# Patient Record
Sex: FEMALE | Race: WHITE | URBAN - NONMETROPOLITAN AREA
[De-identification: names, ages, dates, MRNs, and addresses within clinical notes are randomized per-mention and may not be internally consistent; named-entity substitution may affect disease eponyms.]

---

## 2020-06-22 ENCOUNTER — OFFICE VISIT (OUTPATIENT)
Dept: URBAN - NONMETROPOLITAN AREA CLINIC 2 | Facility: CLINIC | Age: 35
End: 2020-06-22
Payer: COMMERCIAL

## 2020-06-22 DIAGNOSIS — Z12.11 COLON CANCER SCREENING: ICD-10-CM

## 2020-06-22 DIAGNOSIS — K58.9 IBS (IRRITABLE BOWEL SYNDROME): ICD-10-CM

## 2020-06-22 DIAGNOSIS — K21.9 ESOPHAGEAL REFLUX: ICD-10-CM

## 2020-06-22 PROCEDURE — G8427 DOCREV CUR MEDS BY ELIG CLIN: HCPCS | Performed by: NURSE PRACTITIONER

## 2020-06-22 PROCEDURE — 1036F TOBACCO NON-USER: CPT | Performed by: NURSE PRACTITIONER

## 2020-06-22 PROCEDURE — 99213 OFFICE O/P EST LOW 20 MIN: CPT | Performed by: NURSE PRACTITIONER

## 2020-06-22 PROCEDURE — G8417 CALC BMI ABV UP PARAM F/U: HCPCS | Performed by: NURSE PRACTITIONER

## 2020-06-22 RX ORDER — PANTOPRAZOLE SODIUM 40 MG/1
TAKE 1 TABLET BY ORAL ROUTE 2 TIMES A DAY TABLET, DELAYED RELEASE ORAL 2
Qty: 180 | Refills: 3 | Status: ACTIVE | COMMUNITY
Start: 2019-09-26 | End: 2020-09-20

## 2020-06-22 RX ORDER — HYOSCYAMINE SULFATE 0.12 MG/1
1 TABLET UNDER THE TONGUE AND ALLOW TO DISSOLVE  AS NEEDED TABLET SUBLINGUAL
Qty: 180 TABLET | Refills: 6 | OUTPATIENT
Start: 2020-06-22 | End: 2022-03-14

## 2020-06-22 RX ORDER — TRIMETHOBENZAMIDE HYDROCHLORIDE 300 MG/1
CAPSULE ORAL
Qty: 0 | Refills: 0 | Status: ACTIVE | COMMUNITY
Start: 1900-01-01

## 2020-06-22 RX ORDER — FLUTICASONE PROPIONATE 50 UG/1
SPRAY, METERED NASAL
Qty: 0 | Refills: 0 | Status: ACTIVE | COMMUNITY
Start: 1900-01-01

## 2020-06-22 RX ORDER — FAMOTIDINE 40 MG/1
TAKE 1 TABLET (40 MG) BY ORAL ROUTE ONCE DAILY AT BEDTIME TABLET ORAL 1
Qty: 90 | Refills: 3 | Status: ACTIVE | COMMUNITY
Start: 2019-09-26 | End: 2020-09-20

## 2020-06-22 RX ORDER — GUAIFENESIN 1200 MG/1
TAKE 1 TABLET BY MOUTH TWICE DAILY TABLET, EXTENDED RELEASE ORAL
Qty: 0 | Refills: 0 | Status: ACTIVE | COMMUNITY
Start: 1900-01-01

## 2020-06-22 RX ORDER — FLUTICASONE PROPIONATE 100 UG/1
INHALE 1 PUFF (100 MCG) BY INHALATION ROUTE 2 TIMES PER DAY POWDER, METERED RESPIRATORY (INHALATION) 2
Qty: 1 | Refills: 0 | Status: ACTIVE | COMMUNITY
Start: 1900-01-01

## 2020-06-22 RX ORDER — ESOMEPRAZOLE MAGNESIUM 40 MG
TAKE 1 CAPSULE (40 MG) BY ORAL ROUTE ONCE DAILY CAPSULE,DELAYED RELEASE (ENTERIC COATED) ORAL 1
Qty: 0 | Refills: 0 | Status: ACTIVE | COMMUNITY
Start: 1900-01-01

## 2020-06-22 RX ORDER — MONTELUKAST SODIUM 10 MG/1
TAKE 1 TABLET (10 MG) BY ORAL ROUTE ONCE DAILY IN THE EVENING TABLET, FILM COATED ORAL 1
Qty: 0 | Refills: 0 | Status: ACTIVE | COMMUNITY
Start: 1900-01-01

## 2020-06-22 RX ORDER — TOPIRAMATE 25 MG/1
TAKE 1 TABLET BY MOUTH EVERY MORNING AND 2 TABLETS EVERY EVENING TABLET, COATED ORAL 2
Qty: 0 | Refills: 0 | Status: ACTIVE | COMMUNITY
Start: 1900-01-01

## 2020-06-22 NOTE — HPI-TODAY'S VISIT:
6/22/2020 Patient presents for followup of IBS and GERD. She is s/p Xifaxan with no change. She is managing sx with diet. Her reflux is stable on BID PPI and Pepcid. She would like to wean. MB   12/19/2019 Love presents for follow up of reflux and IBS. She is doing wellon BID PPI and famotidine 40mg QHS. She only has breakthrough if she drinks soda. Her IBS has improved with bloating and diarrhea after the xifaxan but she is flaring after being on repeat antibiotics for URI. She is not taking probiotics. Today she is doing well otherwise. MB  9/26/2019 Love presents for follow up of reflux esophagitis. She is s/p EGD with 4 cm of reflux esophagitis. Dr. Lucero was concerned about EoE but her biopsies were negative. She is on nexium 2, 20mg cap in the am and 20mg QHS with breakthrough. She has lost 145lb, is off all her psych medications and getting her life back. She is still struggling with the reflux. MB

## 2020-06-22 NOTE — PHYSICAL EXAM CONSTITUTIONAL:
obese, , well nourished , in no acute distress , ambulating without difficulty , normal communication ability

## 2020-09-28 ENCOUNTER — OFFICE VISIT (OUTPATIENT)
Dept: URBAN - NONMETROPOLITAN AREA CLINIC 2 | Facility: CLINIC | Age: 35
End: 2020-09-28
Payer: COMMERCIAL

## 2020-09-28 DIAGNOSIS — K21.9 ESOPHAGEAL REFLUX: ICD-10-CM

## 2020-09-28 DIAGNOSIS — K58.9 IBS (IRRITABLE BOWEL SYNDROME): ICD-10-CM

## 2020-09-28 DIAGNOSIS — Z12.11 COLON CANCER SCREENING: ICD-10-CM

## 2020-09-28 PROCEDURE — 1036F TOBACCO NON-USER: CPT | Performed by: NURSE PRACTITIONER

## 2020-09-28 PROCEDURE — G8420 CALC BMI NORM PARAMETERS: HCPCS | Performed by: NURSE PRACTITIONER

## 2020-09-28 PROCEDURE — 99213 OFFICE O/P EST LOW 20 MIN: CPT | Performed by: NURSE PRACTITIONER

## 2020-09-28 PROCEDURE — G8427 DOCREV CUR MEDS BY ELIG CLIN: HCPCS | Performed by: NURSE PRACTITIONER

## 2020-09-28 RX ORDER — HYOSCYAMINE SULFATE 0.12 MG/1
1 TABLET UNDER THE TONGUE AND ALLOW TO DISSOLVE  AS NEEDED TABLET SUBLINGUAL
Qty: 180 TABLET | Refills: 6 | Status: ACTIVE | COMMUNITY
Start: 2020-06-22 | End: 2022-03-14

## 2020-09-28 RX ORDER — FLUTICASONE PROPIONATE 100 UG/1
INHALE 1 PUFF (100 MCG) BY INHALATION ROUTE 2 TIMES PER DAY POWDER, METERED RESPIRATORY (INHALATION) 2
Qty: 1 | Refills: 0 | Status: ACTIVE | COMMUNITY
Start: 1900-01-01

## 2020-09-28 RX ORDER — ESOMEPRAZOLE MAGNESIUM 40 MG
TAKE 1 CAPSULE (40 MG) BY ORAL ROUTE ONCE DAILY CAPSULE,DELAYED RELEASE (ENTERIC COATED) ORAL 1
Qty: 0 | Refills: 0 | Status: ACTIVE | COMMUNITY
Start: 1900-01-01

## 2020-09-28 RX ORDER — TRIMETHOBENZAMIDE HYDROCHLORIDE 300 MG/1
CAPSULE ORAL
Qty: 0 | Refills: 0 | Status: ACTIVE | COMMUNITY
Start: 1900-01-01

## 2020-09-28 RX ORDER — FLUTICASONE PROPIONATE 50 UG/1
SPRAY, METERED NASAL
Qty: 0 | Refills: 0 | Status: ACTIVE | COMMUNITY
Start: 1900-01-01

## 2020-09-28 RX ORDER — GUAIFENESIN 1200 MG/1
TAKE 1 TABLET BY MOUTH TWICE DAILY TABLET, EXTENDED RELEASE ORAL
Qty: 0 | Refills: 0 | Status: ACTIVE | COMMUNITY
Start: 1900-01-01

## 2020-09-28 RX ORDER — MONTELUKAST SODIUM 10 MG/1
TAKE 1 TABLET (10 MG) BY ORAL ROUTE ONCE DAILY IN THE EVENING TABLET, FILM COATED ORAL 1
Qty: 0 | Refills: 0 | Status: ACTIVE | COMMUNITY
Start: 1900-01-01

## 2020-09-28 RX ORDER — TOPIRAMATE 25 MG/1
TAKE 1 TABLET BY MOUTH EVERY MORNING AND 2 TABLETS EVERY EVENING TABLET, COATED ORAL 2
Qty: 0 | Refills: 0 | Status: ACTIVE | COMMUNITY
Start: 1900-01-01

## 2020-09-28 NOTE — HPI-TODAY'S VISIT:
9/26/2019 Loev presents for follow up of reflux esophagitis. She is s/p EGD with 4 cm of reflux esophagitis. Dr. Lucero was concerned about EoE but her biopsies were negative. She is on nexium 2, 20mg cap in the am and 20mg QHS with breakthrough. She has lost 145lb, is off all her psych medications and getting her life back. She is still struggling with the reflux. MB    12/19/2019 Love presents for follow up of reflux and IBS. She is doing wellon BID PPI and famotidine 40mg QHS. She only has breakthrough if she drinks soda. Her IBS has improved with bloating and diarrhea after the xifaxan but she is flaring after being on repeat antibiotics for URI. She is not taking probiotics. Today she is doing well otherwise. MB 6/22/2020 Patient presents for followup of IBS and GERD. She is s/p Xifaxan with no change. She is managing sx with diet. Her reflux is stable on BID PPI and Pepcid. She would like to wean. MB 9/28/2020 Mary presents for follow-up of reflux and irritable bowel syndrome.  She has weaned down to pantoprazole 40 mg in the morning, and famotidine in the evening.  She does have to take an extra dose of famotidine at her suppertime occasionally.  She wakes up at noon most days and eats at 5 PM, she usually does not go to sleep until 3:57 AM.  She agrees her schedule has a lot to do with her reflux and IBS.  She would like to wean down to pantoprazole 20 mg daily.  She is using Levsin as needed for her diarrhea.  MB

## 2020-10-07 ENCOUNTER — TELEPHONE ENCOUNTER (OUTPATIENT)
Dept: URBAN - NONMETROPOLITAN AREA CLINIC 2 | Facility: CLINIC | Age: 35
End: 2020-10-07

## 2021-03-26 ENCOUNTER — TELEPHONE ENCOUNTER (OUTPATIENT)
Dept: URBAN - NONMETROPOLITAN AREA CLINIC 2 | Facility: CLINIC | Age: 36
End: 2021-03-26

## 2021-03-29 ENCOUNTER — OFFICE VISIT (OUTPATIENT)
Dept: URBAN - NONMETROPOLITAN AREA CLINIC 13 | Facility: CLINIC | Age: 36
End: 2021-03-29

## 2021-04-21 ENCOUNTER — OFFICE VISIT (OUTPATIENT)
Dept: URBAN - METROPOLITAN AREA TELEHEALTH 2 | Facility: TELEHEALTH | Age: 36
End: 2021-04-21
Payer: COMMERCIAL

## 2021-04-21 DIAGNOSIS — K58.9 IBS (IRRITABLE BOWEL SYNDROME): ICD-10-CM

## 2021-04-21 DIAGNOSIS — K21.9 ESOPHAGEAL REFLUX: ICD-10-CM

## 2021-04-21 PROCEDURE — 99213 OFFICE O/P EST LOW 20 MIN: CPT | Performed by: NURSE PRACTITIONER

## 2021-04-21 RX ORDER — TOPIRAMATE 25 MG/1
TAKE 1 TABLET BY MOUTH EVERY MORNING AND 2 TABLETS EVERY EVENING TABLET, COATED ORAL 2
Qty: 0 | Refills: 0 | Status: ACTIVE | COMMUNITY
Start: 1900-01-01

## 2021-04-21 RX ORDER — HYOSCYAMINE SULFATE 0.12 MG/1
1 TABLET UNDER THE TONGUE AND ALLOW TO DISSOLVE  AS NEEDED TABLET SUBLINGUAL
Qty: 180 TABLET | Refills: 6 | Status: ACTIVE | COMMUNITY
Start: 2020-06-22 | End: 2022-03-14

## 2021-04-21 RX ORDER — TRIMETHOBENZAMIDE HYDROCHLORIDE 300 MG/1
CAPSULE ORAL
Qty: 0 | Refills: 0 | Status: ACTIVE | COMMUNITY
Start: 1900-01-01

## 2021-04-21 RX ORDER — ESOMEPRAZOLE MAGNESIUM 40 MG
TAKE 1 CAPSULE (40 MG) BY ORAL ROUTE ONCE DAILY CAPSULE,DELAYED RELEASE (ENTERIC COATED) ORAL 1
Qty: 0 | Refills: 0 | Status: ACTIVE | COMMUNITY
Start: 1900-01-01

## 2021-04-21 RX ORDER — FLUTICASONE PROPIONATE 50 UG/1
SPRAY, METERED NASAL
Qty: 0 | Refills: 0 | Status: ACTIVE | COMMUNITY
Start: 1900-01-01

## 2021-04-21 RX ORDER — MONTELUKAST SODIUM 10 MG/1
TAKE 1 TABLET (10 MG) BY ORAL ROUTE ONCE DAILY IN THE EVENING TABLET, FILM COATED ORAL 1
Qty: 0 | Refills: 0 | Status: ACTIVE | COMMUNITY
Start: 1900-01-01

## 2021-04-21 RX ORDER — GUAIFENESIN 1200 MG/1
TAKE 1 TABLET BY MOUTH TWICE DAILY TABLET, EXTENDED RELEASE ORAL
Qty: 0 | Refills: 0 | Status: ACTIVE | COMMUNITY
Start: 1900-01-01

## 2021-04-21 RX ORDER — FLUTICASONE PROPIONATE 100 UG/1
INHALE 1 PUFF (100 MCG) BY INHALATION ROUTE 2 TIMES PER DAY POWDER, METERED RESPIRATORY (INHALATION) 2
Qty: 1 | Refills: 0 | Status: ACTIVE | COMMUNITY
Start: 1900-01-01

## 2021-04-21 NOTE — HPI-TODAY'S VISIT:
9/26/2019 Love presents for follow up of reflux esophagitis. She is s/p EGD with 4 cm of reflux esophagitis. Dr. Lucero was concerned about EoE but her biopsies were negative. She is on nexium 2, 20mg cap in the am and 20mg QHS with breakthrough. She has lost 145lb, is off all her psych medications and getting her life back. She is still struggling with the reflux. MB    12/19/2019 Love presents for follow up of reflux and IBS. She is doing wellon BID PPI and famotidine 40mg QHS. She only has breakthrough if she drinks soda. Her IBS has improved with bloating and diarrhea after the xifaxan but she is flaring after being on repeat antibiotics for URI. She is not taking probiotics. Today she is doing well otherwise. MB  6/22/2020 Patient presents for followup of IBS and GERD. She is s/p Xifaxan with no change. She is managing sx with diet. Her reflux is stable on BID PPI and Pepcid. She would like to wean. MB  9/28/2020 Mary presents for follow-up of reflux and irritable bowel syndrome.  She has weaned down to pantoprazole 40 mg in the morning, and famotidine in the evening.  She does have to take an extra dose of famotidine at her suppertime occasionally.  She wakes up at noon most days and eats at 5 PM, she usually does not go to sleep until 3:57 AM.  She agrees her schedule has a lot to do with her reflux and IBS.  She would like to wean down to pantoprazole 20 mg daily.  She is using Levsin as needed for her diarrhea.  MB   4/21/2021 Love presents for follow up of reflux and IBS. She has weaned her protonix to 20mg daily. Her reflux is doing well otherwise. Her IBS is stable on levsin as needed. Today she is doing well. MB

## 2021-05-11 ENCOUNTER — TELEPHONE ENCOUNTER (OUTPATIENT)
Dept: URBAN - NONMETROPOLITAN AREA CLINIC 2 | Facility: CLINIC | Age: 36
End: 2021-05-11

## 2021-07-06 ENCOUNTER — ERX REFILL RESPONSE (OUTPATIENT)
Dept: URBAN - NONMETROPOLITAN AREA CLINIC 2 | Facility: CLINIC | Age: 36
End: 2021-07-06

## 2021-07-06 RX ORDER — HYOSCYAMINE SULFATE 0.12 MG/1
DISSOLVE 1 TABLET UNDER THE TONGUE EVERY 4 TO 6 HOURS AS NEEDED TABLET SUBLINGUAL
Qty: 30 TABLET | Refills: 1 | OUTPATIENT

## 2021-07-06 RX ORDER — HYOSCYAMINE SULFATE 0.12 MG/1
1 TABLET UNDER THE TONGUE AND ALLOW TO DISSOLVE  AS NEEDED TABLET SUBLINGUAL
Qty: 180 TABLET | Refills: 6 | OUTPATIENT

## 2021-07-27 ENCOUNTER — TELEPHONE ENCOUNTER (OUTPATIENT)
Dept: URBAN - NONMETROPOLITAN AREA CLINIC 13 | Facility: CLINIC | Age: 36
End: 2021-07-27

## 2021-07-27 RX ORDER — HYOSCYAMINE SULFATE 0.12 MG/1
DISSOLVE 1 TABLET UNDER THE TONGUE EVERY 4 TO 6 HOURS AS NEEDED TABLET SUBLINGUAL
Qty: 270 TABLET | Refills: 3

## 2021-08-09 ENCOUNTER — TELEPHONE ENCOUNTER (OUTPATIENT)
Dept: URBAN - NONMETROPOLITAN AREA CLINIC 13 | Facility: CLINIC | Age: 36
End: 2021-08-09

## 2022-08-10 ENCOUNTER — TELEPHONE ENCOUNTER (OUTPATIENT)
Dept: URBAN - NONMETROPOLITAN AREA CLINIC 13 | Facility: CLINIC | Age: 37
End: 2022-08-10

## 2022-08-10 RX ORDER — HYOSCYAMINE SULFATE 0.12 MG/1
1 TABLET AS NEEDED TABLET SUBLINGUAL
Qty: 270 TABLETS | Refills: 3

## 2022-08-17 ENCOUNTER — TELEPHONE ENCOUNTER (OUTPATIENT)
Dept: URBAN - METROPOLITAN AREA CLINIC 92 | Facility: CLINIC | Age: 37
End: 2022-08-17

## 2022-08-17 RX ORDER — HYOSCYAMINE SULFATE 0.12 MG/1
1 TABLET AS NEEDED TABLET SUBLINGUAL
Qty: 360 TABLET | Refills: 3

## 2022-12-08 ENCOUNTER — OFFICE VISIT (OUTPATIENT)
Dept: URBAN - NONMETROPOLITAN AREA CLINIC 2 | Facility: CLINIC | Age: 37
End: 2022-12-08

## 2022-12-20 ENCOUNTER — TELEPHONE ENCOUNTER (OUTPATIENT)
Dept: URBAN - NONMETROPOLITAN AREA CLINIC 2 | Facility: CLINIC | Age: 37
End: 2022-12-20

## 2023-02-09 ENCOUNTER — TELEPHONE ENCOUNTER (OUTPATIENT)
Dept: URBAN - NONMETROPOLITAN AREA CLINIC 2 | Facility: CLINIC | Age: 38
End: 2023-02-09

## 2023-03-29 ENCOUNTER — OFFICE VISIT (OUTPATIENT)
Dept: URBAN - METROPOLITAN AREA TELEHEALTH 2 | Facility: TELEHEALTH | Age: 38
End: 2023-03-29
Payer: COMMERCIAL

## 2023-03-29 DIAGNOSIS — K21.9 ESOPHAGEAL REFLUX: ICD-10-CM

## 2023-03-29 DIAGNOSIS — Z12.11 COLON CANCER SCREENING: ICD-10-CM

## 2023-03-29 DIAGNOSIS — K58.9 IBS (IRRITABLE BOWEL SYNDROME): ICD-10-CM

## 2023-03-29 PROCEDURE — 99214 OFFICE O/P EST MOD 30 MIN: CPT | Performed by: NURSE PRACTITIONER

## 2023-03-29 RX ORDER — FLUTICASONE PROPIONATE 100 UG/1
INHALE 1 PUFF (100 MCG) BY INHALATION ROUTE 2 TIMES PER DAY POWDER, METERED RESPIRATORY (INHALATION) 2
Qty: 1 | Refills: 0 | Status: ON HOLD | COMMUNITY
Start: 1900-01-01

## 2023-03-29 RX ORDER — HYOSCYAMINE SULFATE 0.12 MG/1
1 TABLET AS NEEDED TABLET SUBLINGUAL
Qty: 360 TABLET | Refills: 3 | Status: ACTIVE | COMMUNITY

## 2023-03-29 RX ORDER — GUAIFENESIN 1200 MG/1
TAKE 1 TABLET BY MOUTH TWICE DAILY TABLET, EXTENDED RELEASE ORAL
Qty: 0 | Refills: 0 | Status: ON HOLD | COMMUNITY
Start: 1900-01-01

## 2023-03-29 RX ORDER — TOPIRAMATE 25 MG/1
TAKE 1 TABLET BY MOUTH EVERY MORNING AND 2 TABLETS EVERY EVENING TABLET, COATED ORAL 2
Qty: 0 | Refills: 0 | Status: ON HOLD | COMMUNITY
Start: 1900-01-01

## 2023-03-29 RX ORDER — FLUTICASONE PROPIONATE 50 UG/1
SPRAY, METERED NASAL
Qty: 0 | Refills: 0 | Status: ON HOLD | COMMUNITY
Start: 1900-01-01

## 2023-03-29 RX ORDER — TRIMETHOBENZAMIDE HYDROCHLORIDE 300 MG/1
CAPSULE ORAL
Qty: 0 | Refills: 0 | Status: ON HOLD | COMMUNITY
Start: 1900-01-01

## 2023-03-29 RX ORDER — MONTELUKAST SODIUM 10 MG/1
TAKE 1 TABLET (10 MG) BY ORAL ROUTE ONCE DAILY IN THE EVENING TABLET, FILM COATED ORAL 1
Qty: 0 | Refills: 0 | Status: ON HOLD | COMMUNITY
Start: 1900-01-01

## 2023-03-29 RX ORDER — HYOSCYAMINE SULFATE 0.125 MG
1 TABLET ON THE TONGUE AND ALLOW TO DISSOLVE AS NEEDED TABLET,DISINTEGRATING ORAL
Qty: 60 TABLET | Refills: 5

## 2023-03-29 NOTE — HPI-TODAY'S VISIT:
9/26/2019 Love presents for follow up of reflux esophagitis. She is s/p EGD with 4 cm of reflux esophagitis. Dr. Lucero was concerned about EoE but her biopsies were negative. She is on nexium 2, 20mg cap in the am and 20mg QHS with breakthrough. She has lost 145lb, is off all her psych medications and getting her life back. She is still struggling with the reflux. MB    12/19/2019 Love presents for follow up of reflux and IBS. She is doing wellon BID PPI and famotidine 40mg QHS. She only has breakthrough if she drinks soda. Her IBS has improved with bloating and diarrhea after the xifaxan but she is flaring after being on repeat antibiotics for URI. She is not taking probiotics. Today she is doing well otherwise. MB  6/22/2020 Patient presents for followup of IBS and GERD. She is s/p Xifaxan with no change. She is managing sx with diet. Her reflux is stable on BID PPI and Pepcid. She would like to wean. MB  9/28/2020 Mary presents for follow-up of reflux and irritable bowel syndrome.  She has weaned down to pantoprazole 40 mg in the morning, and famotidine in the evening.  She does have to take an extra dose of famotidine at her suppertime occasionally.  She wakes up at noon most days and eats at 5 PM, she usually does not go to sleep until 3:57 AM.  She agrees her schedule has a lot to do with her reflux and IBS.  She would like to wean down to pantoprazole 20 mg daily.  She is using Levsin as needed for her diarrhea.  MB   4/21/2021 Love presents for follow up of reflux and IBS. She has weaned her protonix to 20mg daily. Her reflux is doing well otherwise. Her IBS is stable on levsin as needed. Today she is doing well. MB  3/29/2023 Love presents for follow up of reflux and IBS. She is flaring and back on pantoprazole 20mg BID. She is doing well most days with occasional breakthrough.She is off all daily medications other than her PPI. She denies significant NSAID use. We have discussed adding pepcid PRN. Consider EGD if on relief or symptoms worsen. MB  This telehealth visit was provided at the patient's home.

## 2023-05-30 ENCOUNTER — TELEPHONE ENCOUNTER (OUTPATIENT)
Dept: URBAN - NONMETROPOLITAN AREA CLINIC 2 | Facility: CLINIC | Age: 38
End: 2023-05-30

## 2023-06-01 ENCOUNTER — OFFICE VISIT (OUTPATIENT)
Dept: URBAN - NONMETROPOLITAN AREA CLINIC 2 | Facility: CLINIC | Age: 38
End: 2023-06-01
Payer: COMMERCIAL

## 2023-06-01 ENCOUNTER — WEB ENCOUNTER (OUTPATIENT)
Dept: URBAN - NONMETROPOLITAN AREA CLINIC 2 | Facility: CLINIC | Age: 38
End: 2023-06-01

## 2023-06-01 VITALS
BODY MASS INDEX: 41.46 KG/M2 | HEART RATE: 66 BPM | TEMPERATURE: 98.7 F | WEIGHT: 258 LBS | HEIGHT: 66 IN | SYSTOLIC BLOOD PRESSURE: 114 MMHG | DIASTOLIC BLOOD PRESSURE: 71 MMHG

## 2023-06-01 DIAGNOSIS — K21.9 ESOPHAGEAL REFLUX: ICD-10-CM

## 2023-06-01 DIAGNOSIS — K58.9 IBS (IRRITABLE BOWEL SYNDROME): ICD-10-CM

## 2023-06-01 DIAGNOSIS — R19.7 DIARRHEA OF PRESUMED INFECTIOUS ORIGIN: ICD-10-CM

## 2023-06-01 DIAGNOSIS — Z12.11 COLON CANCER SCREENING: ICD-10-CM

## 2023-06-01 PROCEDURE — 99214 OFFICE O/P EST MOD 30 MIN: CPT | Performed by: NURSE PRACTITIONER

## 2023-06-01 RX ORDER — TOPIRAMATE 25 MG/1
TAKE 1 TABLET BY MOUTH EVERY MORNING AND 2 TABLETS EVERY EVENING TABLET, COATED ORAL 2
Qty: 0 | Refills: 0 | Status: ON HOLD | COMMUNITY
Start: 1900-01-01

## 2023-06-01 RX ORDER — MONTELUKAST SODIUM 10 MG/1
TAKE 1 TABLET (10 MG) BY ORAL ROUTE ONCE DAILY IN THE EVENING TABLET, FILM COATED ORAL 1
Qty: 0 | Refills: 0 | Status: ON HOLD | COMMUNITY
Start: 1900-01-01

## 2023-06-01 RX ORDER — RIFAXIMIN 550 MG/1
1 TABLET TABLET ORAL THREE TIMES A DAY
Qty: 42 TABLET | Refills: 0 | OUTPATIENT
Start: 2023-06-01 | End: 2023-06-15

## 2023-06-01 RX ORDER — GUAIFENESIN 1200 MG/1
TAKE 1 TABLET BY MOUTH TWICE DAILY TABLET, EXTENDED RELEASE ORAL
Qty: 0 | Refills: 0 | Status: ON HOLD | COMMUNITY
Start: 1900-01-01

## 2023-06-01 RX ORDER — HYOSCYAMINE SULFATE 0.125 MG
1 TABLET ON THE TONGUE AND ALLOW TO DISSOLVE AS NEEDED TABLET,DISINTEGRATING ORAL
Qty: 60 TABLET | Refills: 5 | Status: ACTIVE | COMMUNITY

## 2023-06-01 RX ORDER — FLUTICASONE PROPIONATE 100 UG/1
INHALE 1 PUFF (100 MCG) BY INHALATION ROUTE 2 TIMES PER DAY POWDER, METERED RESPIRATORY (INHALATION) 2
Qty: 1 | Refills: 0 | Status: ON HOLD | COMMUNITY
Start: 1900-01-01

## 2023-06-01 RX ORDER — DICYCLOMINE HYDROCHLORIDE 10 MG/1
1 CAPSULE CAPSULE ORAL
Qty: 60 CAPSULES | Refills: 1 | OUTPATIENT
Start: 2023-06-01 | End: 2023-07-31

## 2023-06-01 RX ORDER — FLUTICASONE PROPIONATE 50 UG/1
SPRAY, METERED NASAL
Qty: 0 | Refills: 0 | Status: ON HOLD | COMMUNITY
Start: 1900-01-01

## 2023-06-01 RX ORDER — TRIMETHOBENZAMIDE HYDROCHLORIDE 300 MG/1
CAPSULE ORAL
Qty: 0 | Refills: 0 | Status: ON HOLD | COMMUNITY
Start: 1900-01-01

## 2023-06-01 NOTE — HPI-TODAY'S VISIT:
9/26/2019 Love presents for follow up of reflux esophagitis. She is s/p EGD with 4 cm of reflux esophagitis. Dr. Lucero was concerned about EoE but her biopsies were negative. She is on nexium 2, 20mg cap in the am and 20mg QHS with breakthrough. She has lost 145lb, is off all her psych medications and getting her life back. She is still struggling with the reflux. MB    12/19/2019 Love presents for follow up of reflux and IBS. She is doing wellon BID PPI and famotidine 40mg QHS. She only has breakthrough if she drinks soda. Her IBS has improved with bloating and diarrhea after the xifaxan but she is flaring after being on repeat antibiotics for URI. She is not taking probiotics. Today she is doing well otherwise. MB  6/22/2020 Patient presents for followup of IBS and GERD. She is s/p Xifaxan with no change. She is managing sx with diet. Her reflux is stable on BID PPI and Pepcid. She would like to wean. MB  9/28/2020 Mary presents for follow-up of reflux and irritable bowel syndrome.  She has weaned down to pantoprazole 40 mg in the morning, and famotidine in the evening.  She does have to take an extra dose of famotidine at her suppertime occasionally.  She wakes up at noon most days and eats at 5 PM, she usually does not go to sleep until 3:57 AM.  She agrees her schedule has a lot to do with her reflux and IBS.  She would like to wean down to pantoprazole 20 mg daily.  She is using Levsin as needed for her diarrhea.  MB   4/21/2021 Love presents for follow up of reflux and IBS. She has weaned her protonix to 20mg daily. Her reflux is doing well otherwise. Her IBS is stable on levsin as needed. Today she is doing well. MB  3/29/2023 Love presents for follow up of reflux and IBS. She is flaring and back on pantoprazole 20mg BID. She is doing well most days with occasional breakthrough.She is off all daily medications other than her PPI. She denies significant NSAID use. We have discussed adding pepcid PRN. Consider EGD if on relief or symptoms worsen. MB  This telehealth visit was provided at the patient's home. 6/1/2023 Mary presents for evaluation of acute exasperation of diarrhea.  3 weeks ago she developed urgent watery diarrhea with nausea.  She is having multiple loose urgent bowel movements throughout the day.  She was on antibiotics earlier this year for a sinus infection in January but none since.  She is taking her Levsin intermittently with no relief.  She states she has watery diarrhea after every meal.  She did take Pepto-Bismol which made her stools dark.  She denies any sick contacts.  Her symptoms are slowing down somewhat but she is still having multiple loose bowel movements daily.  Today she agrees to labs and stool studies, if this is normal we will proceed with a course of Xifaxan for postinfectious IBS-D.  In the meantime okay to use dicyclomine 10 mg twice daily for postprandial urgency and diarrhea.  MB

## 2023-06-02 ENCOUNTER — P2P PATIENT RECORD (OUTPATIENT)
Age: 38
End: 2023-06-02

## 2023-06-02 LAB
A/G RATIO: 1.6
ABSOLUTE BASOPHILS: 42
ABSOLUTE EOSINOPHILS: 12
ABSOLUTE LYMPHOCYTES: 1416
ABSOLUTE MONOCYTES: 318
ABSOLUTE NEUTROPHILS: 4212
ALBUMIN: 4.1
ALKALINE PHOSPHATASE: 80
ALT (SGPT): 12
AST (SGOT): 15
BASOPHILS: 0.7
BILIRUBIN, TOTAL: 0.4
BUN/CREATININE RATIO: (no result)
BUN: 7
CALCIUM: 8.8
CARBON DIOXIDE, TOTAL: 23
CHLORIDE: 105
CREATININE: 0.76
EGFR: 102
EOSINOPHILS: 0.2
GLOBULIN, TOTAL: 2.6
GLUCOSE: 89
HEMATOCRIT: 38.6
HEMOGLOBIN: 13
IMMUNOGLOBULIN A: 154
INTERPRETATION: (no result)
LYMPHOCYTES: 23.6
MCH: 28
MCHC: 33.7
MCV: 83.2
MONOCYTES: 5.3
MPV: 10.6
NEUTROPHILS: 70.2
PLATELET COUNT: 251
POTASSIUM: 4.3
PROTEIN, TOTAL: 6.7
RDW: 12.8
RED BLOOD CELL COUNT: 4.64
SODIUM: 136
TISSUE TRANSGLUTAMINASE AB, IGA: <1
WHITE BLOOD CELL COUNT: 6

## 2023-06-06 ENCOUNTER — TELEPHONE ENCOUNTER (OUTPATIENT)
Dept: URBAN - NONMETROPOLITAN AREA CLINIC 2 | Facility: CLINIC | Age: 38
End: 2023-06-06

## 2023-06-09 ENCOUNTER — TELEPHONE ENCOUNTER (OUTPATIENT)
Dept: URBAN - NONMETROPOLITAN AREA CLINIC 2 | Facility: CLINIC | Age: 38
End: 2023-06-09

## 2023-06-09 LAB
CALPROTECTIN, FECAL: 8
CAMPYLOBACTER SPP. AG,EIA: (no result)
CLOSTRIDIUM DIFFICILE: (no result)
GIARDIA AG, EIA, STOOL: (no result)
LEUKOCYTES: (no result)
OVA AND PARASITES, CONC AND PERM SMEAR: (no result)
SALMONELLA AND SHIGELLA, CULTURE: (no result)
SHIGA TOXINS, EIA W/RFL TO E.COLI O157 CULTURE: (no result)

## 2023-06-23 ENCOUNTER — WEB ENCOUNTER (OUTPATIENT)
Dept: URBAN - NONMETROPOLITAN AREA CLINIC 2 | Facility: CLINIC | Age: 38
End: 2023-06-23

## 2023-06-28 ENCOUNTER — WEB ENCOUNTER (OUTPATIENT)
Dept: URBAN - NONMETROPOLITAN AREA CLINIC 2 | Facility: CLINIC | Age: 38
End: 2023-06-28

## 2023-06-28 RX ORDER — DICYCLOMINE HYDROCHLORIDE 10 MG/1
1 CAPSULE CAPSULE ORAL
Qty: 60 CAPSULES | Refills: 1
Start: 2023-06-01 | End: 2023-08-28

## 2023-10-08 ENCOUNTER — ERX REFILL RESPONSE (OUTPATIENT)
Dept: URBAN - NONMETROPOLITAN AREA CLINIC 2 | Facility: CLINIC | Age: 38
End: 2023-10-08

## 2023-10-08 RX ORDER — DICYCLOMINE HYDROCHLORIDE 10 MG/1
TAKE 1 CAPSULE BY MOUTH TWICE DAILY BEFORE MEAL(S) CAPSULE ORAL
Qty: 60 CAPSULE | Refills: 1 | OUTPATIENT

## 2023-10-08 RX ORDER — DICYCLOMINE HYDROCHLORIDE 10 MG/1
TAKE 1 CAPSULE BY MOUTH TWICE DAILY BEFORE MEAL(S) CAPSULE ORAL
Qty: 60 CAPSULE | Refills: 11 | OUTPATIENT

## 2023-10-16 ENCOUNTER — WEB ENCOUNTER (OUTPATIENT)
Dept: URBAN - NONMETROPOLITAN AREA CLINIC 2 | Facility: CLINIC | Age: 38
End: 2023-10-16

## 2023-10-16 RX ORDER — DICYCLOMINE HYDROCHLORIDE 10 MG/1
TAKE 1 CAPSULE BY MOUTH TWICE DAILY BEFORE MEAL(S) CAPSULE ORAL
Qty: 60 CAPSULE | Refills: 11

## 2023-12-14 ENCOUNTER — CLAIMS CREATED FROM THE CLAIM WINDOW (OUTPATIENT)
Dept: URBAN - NONMETROPOLITAN AREA CLINIC 2 | Facility: CLINIC | Age: 38
End: 2023-12-14
Payer: COMMERCIAL

## 2023-12-14 ENCOUNTER — LAB OUTSIDE AN ENCOUNTER (OUTPATIENT)
Dept: URBAN - NONMETROPOLITAN AREA CLINIC 2 | Facility: CLINIC | Age: 38
End: 2023-12-14

## 2023-12-14 VITALS
DIASTOLIC BLOOD PRESSURE: 85 MMHG | HEART RATE: 80 BPM | BODY MASS INDEX: 43.39 KG/M2 | HEIGHT: 66 IN | SYSTOLIC BLOOD PRESSURE: 131 MMHG | WEIGHT: 270 LBS | TEMPERATURE: 98 F

## 2023-12-14 DIAGNOSIS — K58.9 IBS (IRRITABLE BOWEL SYNDROME): ICD-10-CM

## 2023-12-14 DIAGNOSIS — Z12.11 COLON CANCER SCREENING: ICD-10-CM

## 2023-12-14 DIAGNOSIS — K21.9 ESOPHAGEAL REFLUX: ICD-10-CM

## 2023-12-14 DIAGNOSIS — R19.7 DIARRHEA OF PRESUMED INFECTIOUS ORIGIN: ICD-10-CM

## 2023-12-14 DIAGNOSIS — K58.0 IBS-D: ICD-10-CM

## 2023-12-14 PROBLEM — 43240000: Status: ACTIVE | Noted: 2023-06-01

## 2023-12-14 PROCEDURE — 99214 OFFICE O/P EST MOD 30 MIN: CPT | Performed by: NURSE PRACTITIONER

## 2023-12-14 RX ORDER — FLUTICASONE PROPIONATE 100 UG/1
INHALE 1 PUFF (100 MCG) BY INHALATION ROUTE 2 TIMES PER DAY POWDER, METERED RESPIRATORY (INHALATION) 2
Qty: 1 | Refills: 0 | Status: ON HOLD | COMMUNITY
Start: 1900-01-01

## 2023-12-14 RX ORDER — FAMOTIDINE 40 MG/1
1 TABLET BEFORE SUPPER TABLET, FILM COATED ORAL ONCE A DAY
Qty: 90 TABLET | Refills: 3 | OUTPATIENT
Start: 2023-12-14

## 2023-12-14 RX ORDER — DICYCLOMINE HYDROCHLORIDE 10 MG/1
TAKE 1 CAPSULE BY MOUTH TWICE DAILY BEFORE MEAL(S) CAPSULE ORAL
Qty: 60 CAPSULE | Refills: 11 | Status: ACTIVE | COMMUNITY

## 2023-12-14 RX ORDER — TRIMETHOBENZAMIDE HYDROCHLORIDE 300 MG/1
CAPSULE ORAL
Qty: 0 | Refills: 0 | Status: ON HOLD | COMMUNITY
Start: 1900-01-01

## 2023-12-14 RX ORDER — TOPIRAMATE 25 MG/1
TAKE 1 TABLET BY MOUTH EVERY MORNING AND 2 TABLETS EVERY EVENING TABLET, COATED ORAL 2
Qty: 0 | Refills: 0 | Status: ON HOLD | COMMUNITY
Start: 1900-01-01

## 2023-12-14 RX ORDER — GUAIFENESIN 1200 MG/1
TAKE 1 TABLET BY MOUTH TWICE DAILY TABLET, EXTENDED RELEASE ORAL
Qty: 0 | Refills: 0 | Status: ON HOLD | COMMUNITY
Start: 1900-01-01

## 2023-12-14 RX ORDER — FLUTICASONE PROPIONATE 50 UG/1
SPRAY, METERED NASAL
Qty: 0 | Refills: 0 | Status: ON HOLD | COMMUNITY
Start: 1900-01-01

## 2023-12-14 RX ORDER — HYOSCYAMINE SULFATE 0.125 MG
1 TABLET ON THE TONGUE AND ALLOW TO DISSOLVE AS NEEDED TABLET,DISINTEGRATING ORAL
Qty: 60 TABLET | Refills: 5 | Status: ACTIVE | COMMUNITY

## 2023-12-14 RX ORDER — MONTELUKAST SODIUM 10 MG/1
TAKE 1 TABLET (10 MG) BY ORAL ROUTE ONCE DAILY IN THE EVENING TABLET, FILM COATED ORAL 1
Qty: 0 | Refills: 0 | Status: ON HOLD | COMMUNITY
Start: 1900-01-01

## 2023-12-14 NOTE — HPI-TODAY'S VISIT:
9/26/2019 Love presents for follow up of reflux esophagitis. She is s/p EGD with 4 cm of reflux esophagitis. Dr. Lucero was concerned about EoE but her biopsies were negative. She is on nexium 2, 20mg cap in the am and 20mg QHS with breakthrough. She has lost 145lb, is off all her psych medications and getting her life back. She is still struggling with the reflux. MB    12/19/2019 Love presents for follow up of reflux and IBS. She is doing wellon BID PPI and famotidine 40mg QHS. She only has breakthrough if she drinks soda. Her IBS has improved with bloating and diarrhea after the xifaxan but she is flaring after being on repeat antibiotics for URI. She is not taking probiotics. Today she is doing well otherwise. MB  6/22/2020 Patient presents for followup of IBS and GERD. She is s/p Xifaxan with no change. She is managing sx with diet. Her reflux is stable on BID PPI and Pepcid. She would like to wean. MB  9/28/2020 Mary presents for follow-up of reflux and irritable bowel syndrome.  She has weaned down to pantoprazole 40 mg in the morning, and famotidine in the evening.  She does have to take an extra dose of famotidine at her suppertime occasionally.  She wakes up at noon most days and eats at 5 PM, she usually does not go to sleep until 3:57 AM.  She agrees her schedule has a lot to do with her reflux and IBS.  She would like to wean down to pantoprazole 20 mg daily.  She is using Levsin as needed for her diarrhea.  MB   4/21/2021 Love presents for follow up of reflux and IBS. She has weaned her protonix to 20mg daily. Her reflux is doing well otherwise. Her IBS is stable on levsin as needed. Today she is doing well. MB  3/29/2023 Love presents for follow up of reflux and IBS. She is flaring and back on pantoprazole 20mg BID. She is doing well most days with occasional breakthrough.She is off all daily medications other than her PPI. She denies significant NSAID use. We have discussed adding pepcid PRN. Consider EGD if on relief or symptoms worsen. MB  This telehealth visit was provided at the patient's home. 6/1/2023 Mary presents for evaluation of acute exasperation of diarrhea.  3 weeks ago she developed urgent watery diarrhea with nausea.  She is having multiple loose urgent bowel movements throughout the day.  She was on antibiotics earlier this year for a sinus infection in January but none since.  She is taking her Levsin intermittently with no relief.  She states she has watery diarrhea after every meal.  She did take Pepto-Bismol which made her stools dark.  She denies any sick contacts.  Her symptoms are slowing down somewhat but she is still having multiple loose bowel movements daily.  Today she agrees to labs and stool studies, if this is normal we will proceed with a course of Xifaxan for postinfectious IBS-D.  In the meantime okay to use dicyclomine 10 mg twice daily for postprandial urgency and diarrhea.  MB 12/14/2023 Mary presents for follow-up of reflux and IBS.  Her bowels are doing great on align daily.  She continues to struggle with reflux.  She is on pantoprazole 20 mg twice daily and having to take Pepcid often for breakthrough symptoms.  Her EGD in 2019 shows questionable eosinophilic esophagitis but her biopsies are negative.  Today she agrees to increase her PPI to pantoprazole 40 mg in the morning and famotidine 40 mg before supper, schedule repeat EGD to rule out eosinophilic esophagitis.  MB

## 2024-03-13 ENCOUNTER — EGD (OUTPATIENT)
Dept: URBAN - NONMETROPOLITAN AREA SURGERY CENTER 1 | Facility: SURGERY CENTER | Age: 39
End: 2024-03-13
Payer: COMMERCIAL

## 2024-03-13 ENCOUNTER — LAB (OUTPATIENT)
Dept: URBAN - METROPOLITAN AREA CLINIC 4 | Facility: CLINIC | Age: 39
End: 2024-03-13
Payer: COMMERCIAL

## 2024-03-13 DIAGNOSIS — K31.89 OTHER DISEASES OF STOMACH AND DUODENUM: ICD-10-CM

## 2024-03-13 DIAGNOSIS — K29.70 GASTRITIS, UNSPECIFIED, WITHOUT BLEEDING: ICD-10-CM

## 2024-03-13 DIAGNOSIS — R10.13 ABDOMINAL DISCOMFORT, EPIGASTRIC: ICD-10-CM

## 2024-03-13 DIAGNOSIS — K21.9 ACID REFLUX: ICD-10-CM

## 2024-03-13 PROCEDURE — 88305 TISSUE EXAM BY PATHOLOGIST: CPT | Performed by: PATHOLOGY

## 2024-03-13 PROCEDURE — 88312 SPECIAL STAINS GROUP 1: CPT | Performed by: PATHOLOGY

## 2024-03-13 PROCEDURE — 43239 EGD BIOPSY SINGLE/MULTIPLE: CPT | Performed by: INTERNAL MEDICINE

## 2024-03-13 RX ORDER — GUAIFENESIN 1200 MG/1
TAKE 1 TABLET BY MOUTH TWICE DAILY TABLET, EXTENDED RELEASE ORAL
Qty: 0 | Refills: 0 | Status: ON HOLD | COMMUNITY
Start: 1900-01-01

## 2024-03-13 RX ORDER — HYOSCYAMINE SULFATE 0.125 MG
1 TABLET ON THE TONGUE AND ALLOW TO DISSOLVE AS NEEDED TABLET,DISINTEGRATING ORAL
Qty: 60 TABLET | Refills: 5 | Status: ACTIVE | COMMUNITY

## 2024-03-13 RX ORDER — FLUTICASONE PROPIONATE 50 UG/1
SPRAY, METERED NASAL
Qty: 0 | Refills: 0 | Status: ON HOLD | COMMUNITY
Start: 1900-01-01

## 2024-03-13 RX ORDER — TOPIRAMATE 25 MG/1
TAKE 1 TABLET BY MOUTH EVERY MORNING AND 2 TABLETS EVERY EVENING TABLET, COATED ORAL 2
Qty: 0 | Refills: 0 | Status: ON HOLD | COMMUNITY
Start: 1900-01-01

## 2024-03-13 RX ORDER — FAMOTIDINE 40 MG/1
1 TABLET BEFORE SUPPER TABLET, FILM COATED ORAL ONCE A DAY
Qty: 90 TABLET | Refills: 3 | Status: ACTIVE | COMMUNITY
Start: 2023-12-14

## 2024-03-13 RX ORDER — FLUTICASONE PROPIONATE 100 UG/1
INHALE 1 PUFF (100 MCG) BY INHALATION ROUTE 2 TIMES PER DAY POWDER, METERED RESPIRATORY (INHALATION) 2
Qty: 1 | Refills: 0 | Status: ON HOLD | COMMUNITY
Start: 1900-01-01

## 2024-03-13 RX ORDER — MONTELUKAST SODIUM 10 MG/1
TAKE 1 TABLET (10 MG) BY ORAL ROUTE ONCE DAILY IN THE EVENING TABLET, FILM COATED ORAL 1
Qty: 0 | Refills: 0 | Status: ON HOLD | COMMUNITY
Start: 1900-01-01

## 2024-03-13 RX ORDER — TRIMETHOBENZAMIDE HYDROCHLORIDE 300 MG/1
CAPSULE ORAL
Qty: 0 | Refills: 0 | Status: ON HOLD | COMMUNITY
Start: 1900-01-01

## 2024-03-13 RX ORDER — DICYCLOMINE HYDROCHLORIDE 10 MG/1
TAKE 1 CAPSULE BY MOUTH TWICE DAILY BEFORE MEAL(S) CAPSULE ORAL
Qty: 60 CAPSULE | Refills: 11 | Status: ACTIVE | COMMUNITY

## 2024-04-23 ENCOUNTER — OV EP (OUTPATIENT)
Dept: URBAN - NONMETROPOLITAN AREA CLINIC 2 | Facility: CLINIC | Age: 39
End: 2024-04-23
Payer: COMMERCIAL

## 2024-04-23 VITALS
BODY MASS INDEX: 44.2 KG/M2 | DIASTOLIC BLOOD PRESSURE: 82 MMHG | WEIGHT: 275 LBS | TEMPERATURE: 98.5 F | HEIGHT: 66 IN | HEART RATE: 69 BPM | SYSTOLIC BLOOD PRESSURE: 123 MMHG

## 2024-04-23 DIAGNOSIS — K58.9 IRRITABLE BOWEL SYNDROME WITHOUT DIARRHEA: ICD-10-CM

## 2024-04-23 DIAGNOSIS — R19.7 DIARRHEA OF PRESUMED INFECTIOUS ORIGIN: ICD-10-CM

## 2024-04-23 DIAGNOSIS — Z12.11 COLON CANCER SCREENING: ICD-10-CM

## 2024-04-23 DIAGNOSIS — K21.9 ESOPHAGEAL REFLUX: ICD-10-CM

## 2024-04-23 PROCEDURE — 99214 OFFICE O/P EST MOD 30 MIN: CPT | Performed by: NURSE PRACTITIONER

## 2024-04-23 RX ORDER — FLUTICASONE PROPIONATE 100 UG/1
INHALE 1 PUFF (100 MCG) BY INHALATION ROUTE 2 TIMES PER DAY POWDER, METERED RESPIRATORY (INHALATION) 2
Qty: 1 | Refills: 0 | Status: ON HOLD | COMMUNITY
Start: 1900-01-01

## 2024-04-23 RX ORDER — TRIMETHOBENZAMIDE HYDROCHLORIDE 300 MG/1
CAPSULE ORAL
Qty: 0 | Refills: 0 | Status: ON HOLD | COMMUNITY
Start: 1900-01-01

## 2024-04-23 RX ORDER — GUAIFENESIN 1200 MG/1
TAKE 1 TABLET BY MOUTH TWICE DAILY TABLET, EXTENDED RELEASE ORAL
Qty: 0 | Refills: 0 | Status: ON HOLD | COMMUNITY
Start: 1900-01-01

## 2024-04-23 RX ORDER — PROMETHAZINE HYDROCHLORIDE 25 MG/1
TAKE 1 TABLET BY MOUTH EVERY 6 HOURS AS NEEDED TABLET ORAL
Qty: 60 EACH | Refills: 3 | Status: ACTIVE | COMMUNITY

## 2024-04-23 RX ORDER — TRAZODONE HYDROCHLORIDE 150 MG/1
1 TABLET AT BEDTIME TABLET ORAL ONCE A DAY
Status: ACTIVE | COMMUNITY

## 2024-04-23 RX ORDER — INDOMETHACIN 25 MG/1
TAKE 1 CAPSULE BY MOUTH THREE TIMES DAILY WITH FOOD FOR 2 WEEKS, THEN TAKE AS NEEDED CAPSULE ORAL
Qty: 90 EACH | Refills: 1 | Status: ACTIVE | COMMUNITY

## 2024-04-23 RX ORDER — FLUTICASONE PROPIONATE 50 UG/1
SPRAY, METERED NASAL
Qty: 0 | Refills: 0 | Status: ON HOLD | COMMUNITY
Start: 1900-01-01

## 2024-04-23 RX ORDER — RIFAXIMIN 550 MG/1
1 TABLET TABLET ORAL THREE TIMES A DAY
Qty: 42 TABLET | Refills: 0 | OUTPATIENT
Start: 2024-04-23 | End: 2024-05-07

## 2024-04-23 RX ORDER — TOPIRAMATE 25 MG/1
TAKE 1 TABLET BY MOUTH EVERY MORNING AND 2 TABLETS EVERY EVENING TABLET, COATED ORAL 2
Qty: 0 | Refills: 0 | Status: ON HOLD | COMMUNITY
Start: 1900-01-01

## 2024-04-23 RX ORDER — HYOSCYAMINE SULFATE 0.125 MG
1 TABLET ON THE TONGUE AND ALLOW TO DISSOLVE AS NEEDED TABLET,DISINTEGRATING ORAL
Qty: 60 TABLETS | Refills: 5 | Status: ACTIVE | COMMUNITY

## 2024-04-23 RX ORDER — FAMOTIDINE 40 MG/1
1 TABLET AT BEDTIME TABLET, FILM COATED ORAL ONCE A DAY
Qty: 90 TABLET | Refills: 3 | OUTPATIENT
Start: 2024-04-23

## 2024-04-23 RX ORDER — MONTELUKAST SODIUM 10 MG/1
TAKE 1 TABLET (10 MG) BY ORAL ROUTE ONCE DAILY IN THE EVENING TABLET, FILM COATED ORAL 1
Qty: 0 | Refills: 0 | Status: ON HOLD | COMMUNITY
Start: 1900-01-01

## 2024-04-23 RX ORDER — FAMOTIDINE 40 MG/1
1 TABLET BEFORE SUPPER TABLET, FILM COATED ORAL ONCE A DAY
Qty: 90 TABLET | Refills: 3 | Status: ACTIVE | COMMUNITY
Start: 2023-12-14

## 2024-04-23 RX ORDER — ESZOPICLONE 1 MG/1
1 TABLET IMMEDIATELY BEFORE BEDTIME TABLET, COATED ORAL ONCE A DAY
Status: ACTIVE | COMMUNITY

## 2024-04-23 RX ORDER — DESVENLAFAXINE SUCCINATE 50 MG/1
1 TABLET TABLET, EXTENDED RELEASE ORAL ONCE A DAY
Status: ACTIVE | COMMUNITY

## 2024-04-23 RX ORDER — DICYCLOMINE HYDROCHLORIDE 10 MG/1
TAKE 1 CAPSULE BY MOUTH TWICE DAILY BEFORE MEAL(S) CAPSULE ORAL
Qty: 60 CAPSULE | Refills: 11 | Status: ACTIVE | COMMUNITY

## 2024-04-23 NOTE — HPI-TODAY'S VISIT:
9/26/2019 Love presents for follow up of reflux esophagitis. She is s/p EGD with 4 cm of reflux esophagitis. Dr. Lucero was concerned about EoE but her biopsies were negative. She is on nexium 2, 20mg cap in the am and 20mg QHS with breakthrough. She has lost 145lb, is off all her psych medications and getting her life back. She is still struggling with the reflux. MB    12/19/2019 Love presents for follow up of reflux and IBS. She is doing wellon BID PPI and famotidine 40mg QHS. She only has breakthrough if she drinks soda. Her IBS has improved with bloating and diarrhea after the xifaxan but she is flaring after being on repeat antibiotics for URI. She is not taking probiotics. Today she is doing well otherwise. MB  6/22/2020 Patient presents for followup of IBS and GERD. She is s/p Xifaxan with no change. She is managing sx with diet. Her reflux is stable on BID PPI and Pepcid. She would like to wean. MB  9/28/2020 Mary presents for follow-up of reflux and irritable bowel syndrome.  She has weaned down to pantoprazole 40 mg in the morning, and famotidine in the evening.  She does have to take an extra dose of famotidine at her suppertime occasionally.  She wakes up at noon most days and eats at 5 PM, she usually does not go to sleep until 3:57 AM.  She agrees her schedule has a lot to do with her reflux and IBS.  She would like to wean down to pantoprazole 20 mg daily.  She is using Levsin as needed for her diarrhea.  MB   4/21/2021 Love presents for follow up of reflux and IBS. She has weaned her protonix to 20mg daily. Her reflux is doing well otherwise. Her IBS is stable on levsin as needed. Today she is doing well. MB  3/29/2023 Love presents for follow up of reflux and IBS. She is flaring and back on pantoprazole 20mg BID. She is doing well most days with occasional breakthrough.She is off all daily medications other than her PPI. She denies significant NSAID use. We have discussed adding pepcid PRN. Consider EGD if on relief or symptoms worsen. MB  This telehealth visit was provided at the patient's home. 6/1/2023 Mary presents for evaluation of acute exasperation of diarrhea.  3 weeks ago she developed urgent watery diarrhea with nausea.  She is having multiple loose urgent bowel movements throughout the day.  She was on antibiotics earlier this year for a sinus infection in January but none since.  She is taking her Levsin intermittently with no relief.  She states she has watery diarrhea after every meal.  She did take Pepto-Bismol which made her stools dark.  She denies any sick contacts.  Her symptoms are slowing down somewhat but she is still having multiple loose bowel movements daily.  Today she agrees to labs and stool studies, if this is normal we will proceed with a course of Xifaxan for postinfectious IBS-D.  In the meantime okay to use dicyclomine 10 mg twice daily for postprandial urgency and diarrhea.  MB 12/14/2023 Mary presents for follow-up of reflux and IBS.  Her bowels are doing great on align daily.  She continues to struggle with reflux.  She is on pantoprazole 20 mg twice daily and having to take Pepcid often for breakthrough symptoms.  Her EGD in 2019 shows questionable eosinophilic esophagitis but her biopsies are negative.  Today she agrees to increase her PPI to pantoprazole 40 mg in the morning and famotidine 40 mg before supper, schedule repeat EGD to rule out eosinophilic esophagitis.  MB 4/23/2024 Mary presents for follow-up of reflux and IBS.  Her EGD shows no EOE.  She is on famotidine 40 in the morning and an extra 20 mg dose as needed in the evenings.  She does take Protonix occasionally as needed.  Her reflux is actually improved.  She has had a flare of IBS with bloating cramping and diarrhea.  She would like to repeat a course of Xifaxan.  She is taking align daily.  Today she is doing fairly well otherwise.  MB

## 2024-06-03 ENCOUNTER — WEB ENCOUNTER (OUTPATIENT)
Dept: URBAN - NONMETROPOLITAN AREA CLINIC 2 | Facility: CLINIC | Age: 39
End: 2024-06-03

## 2024-10-22 ENCOUNTER — OFFICE VISIT (OUTPATIENT)
Dept: URBAN - NONMETROPOLITAN AREA CLINIC 2 | Facility: CLINIC | Age: 39
End: 2024-10-22

## 2024-12-04 ENCOUNTER — DASHBOARD ENCOUNTERS (OUTPATIENT)
Age: 39
End: 2024-12-04

## 2024-12-04 ENCOUNTER — OFFICE VISIT (OUTPATIENT)
Dept: URBAN - METROPOLITAN AREA TELEHEALTH 2 | Facility: TELEHEALTH | Age: 39
End: 2024-12-04
Payer: COMMERCIAL

## 2024-12-04 DIAGNOSIS — K58.9 IBS (IRRITABLE BOWEL SYNDROME): ICD-10-CM

## 2024-12-04 DIAGNOSIS — R19.7 DIARRHEA OF PRESUMED INFECTIOUS ORIGIN: ICD-10-CM

## 2024-12-04 DIAGNOSIS — Z12.11 COLON CANCER SCREENING: ICD-10-CM

## 2024-12-04 DIAGNOSIS — K21.9 ESOPHAGEAL REFLUX: ICD-10-CM

## 2024-12-04 PROCEDURE — 99214 OFFICE O/P EST MOD 30 MIN: CPT | Performed by: NURSE PRACTITIONER

## 2024-12-04 RX ORDER — HYOSCYAMINE SULFATE 0.38 MG/1
1 TABLET TABLET, EXTENDED RELEASE ORAL
Qty: 60 TABLET | Refills: 11 | OUTPATIENT
Start: 2024-12-04 | End: 2025-11-29

## 2024-12-04 RX ORDER — INDOMETHACIN 25 MG/1
TAKE 1 CAPSULE BY MOUTH THREE TIMES DAILY WITH FOOD FOR 2 WEEKS, THEN TAKE AS NEEDED CAPSULE ORAL
Qty: 90 EACH | Refills: 1 | Status: ACTIVE | COMMUNITY

## 2024-12-04 RX ORDER — GUAIFENESIN 1200 MG/1
TAKE 1 TABLET BY MOUTH TWICE DAILY TABLET, EXTENDED RELEASE ORAL
Qty: 0 | Refills: 0 | Status: ON HOLD | COMMUNITY
Start: 1900-01-01

## 2024-12-04 RX ORDER — DICYCLOMINE HYDROCHLORIDE 10 MG/1
TAKE 1 CAPSULE BY MOUTH TWICE DAILY BEFORE MEAL(S) CAPSULE ORAL
Qty: 60 CAPSULE | Refills: 11 | Status: ACTIVE | COMMUNITY

## 2024-12-04 RX ORDER — FLUTICASONE PROPIONATE 50 UG/1
SPRAY, METERED NASAL
Qty: 0 | Refills: 0 | Status: ON HOLD | COMMUNITY
Start: 1900-01-01

## 2024-12-04 RX ORDER — FAMOTIDINE 40 MG/1
1 TABLET BEFORE SUPPER TABLET, FILM COATED ORAL ONCE A DAY
Qty: 90 TABLET | Refills: 3 | Status: ACTIVE | COMMUNITY
Start: 2023-12-14

## 2024-12-04 RX ORDER — TRIMETHOBENZAMIDE HYDROCHLORIDE 300 MG/1
CAPSULE ORAL
Qty: 0 | Refills: 0 | Status: ON HOLD | COMMUNITY
Start: 1900-01-01

## 2024-12-04 RX ORDER — FAMOTIDINE 40 MG/1
1 TABLET AT BEDTIME TABLET, FILM COATED ORAL ONCE A DAY
Qty: 90 TABLET | Refills: 3 | Status: ACTIVE | COMMUNITY
Start: 2024-04-23

## 2024-12-04 RX ORDER — PROMETHAZINE HYDROCHLORIDE 25 MG/1
TAKE 1 TABLET BY MOUTH EVERY 6 HOURS AS NEEDED TABLET ORAL
Qty: 60 EACH | Refills: 3 | Status: ACTIVE | COMMUNITY

## 2024-12-04 RX ORDER — FLUTICASONE PROPIONATE 100 UG/1
INHALE 1 PUFF (100 MCG) BY INHALATION ROUTE 2 TIMES PER DAY POWDER, METERED RESPIRATORY (INHALATION) 2
Qty: 1 | Refills: 0 | Status: ON HOLD | COMMUNITY
Start: 1900-01-01

## 2024-12-04 RX ORDER — SUCRALFATE 1 G/1
1 TABLET CRUSHED AND MIXED WITH 30ML OF WATER TABLET ORAL
Qty: 180 TABLET | Refills: 3 | OUTPATIENT
Start: 2024-12-04 | End: 2025-11-29

## 2024-12-04 RX ORDER — TOPIRAMATE 25 MG/1
TAKE 1 TABLET BY MOUTH EVERY MORNING AND 2 TABLETS EVERY EVENING TABLET, COATED ORAL 2
Qty: 0 | Refills: 0 | Status: ON HOLD | COMMUNITY
Start: 1900-01-01

## 2024-12-04 RX ORDER — ESZOPICLONE 1 MG/1
1 TABLET IMMEDIATELY BEFORE BEDTIME TABLET, COATED ORAL ONCE A DAY
Status: ACTIVE | COMMUNITY

## 2024-12-04 RX ORDER — HYOSCYAMINE SULFATE 0.125 MG
1 TABLET ON THE TONGUE AND ALLOW TO DISSOLVE AS NEEDED TABLET,DISINTEGRATING ORAL
Qty: 60 TABLETS | Refills: 5 | Status: ACTIVE | COMMUNITY

## 2024-12-04 RX ORDER — TRAZODONE HYDROCHLORIDE 150 MG/1
1 TABLET AT BEDTIME TABLET ORAL ONCE A DAY
Status: ACTIVE | COMMUNITY

## 2024-12-04 RX ORDER — MONTELUKAST SODIUM 10 MG/1
TAKE 1 TABLET (10 MG) BY ORAL ROUTE ONCE DAILY IN THE EVENING TABLET, FILM COATED ORAL 1
Qty: 0 | Refills: 0 | Status: ON HOLD | COMMUNITY
Start: 1900-01-01

## 2024-12-04 RX ORDER — DESVENLAFAXINE SUCCINATE 50 MG/1
1 TABLET TABLET, EXTENDED RELEASE ORAL ONCE A DAY
Status: ACTIVE | COMMUNITY

## 2024-12-04 NOTE — HPI-TODAY'S VISIT:
9/26/2019 Love presents for follow up of reflux esophagitis. She is s/p EGD with 4 cm of reflux esophagitis. Dr. Lucero was concerned about EoE but her biopsies were negative. She is on nexium 2, 20mg cap in the am and 20mg QHS with breakthrough. She has lost 145lb, is off all her psych medications and getting her life back. She is still struggling with the reflux. MB    12/19/2019 Love presents for follow up of reflux and IBS. She is doing wellon BID PPI and famotidine 40mg QHS. She only has breakthrough if she drinks soda. Her IBS has improved with bloating and diarrhea after the xifaxan but she is flaring after being on repeat antibiotics for URI. She is not taking probiotics. Today she is doing well otherwise. MB  6/22/2020 Patient presents for followup of IBS and GERD. She is s/p Xifaxan with no change. She is managing sx with diet. Her reflux is stable on BID PPI and Pepcid. She would like to wean. MB  9/28/2020 Mary presents for follow-up of reflux and irritable bowel syndrome.  She has weaned down to pantoprazole 40 mg in the morning, and famotidine in the evening.  She does have to take an extra dose of famotidine at her suppertime occasionally.  She wakes up at noon most days and eats at 5 PM, she usually does not go to sleep until 3:57 AM.  She agrees her schedule has a lot to do with her reflux and IBS.  She would like to wean down to pantoprazole 20 mg daily.  She is using Levsin as needed for her diarrhea.  MB   4/21/2021 Love presents for follow up of reflux and IBS. She has weaned her protonix to 20mg daily. Her reflux is doing well otherwise. Her IBS is stable on levsin as needed. Today she is doing well. MB  3/29/2023 Love presents for follow up of reflux and IBS. She is flaring and back on pantoprazole 20mg BID. She is doing well most days with occasional breakthrough.She is off all daily medications other than her PPI. She denies significant NSAID use. We have discussed adding pepcid PRN. Consider EGD if on relief or symptoms worsen. MB  This telehealth visit was provided at the patient's home. 6/1/2023 Mary presents for evaluation of acute exasperation of diarrhea.  3 weeks ago she developed urgent watery diarrhea with nausea.  She is having multiple loose urgent bowel movements throughout the day.  She was on antibiotics earlier this year for a sinus infection in January but none since.  She is taking her Levsin intermittently with no relief.  She states she has watery diarrhea after every meal.  She did take Pepto-Bismol which made her stools dark.  She denies any sick contacts.  Her symptoms are slowing down somewhat but she is still having multiple loose bowel movements daily.  Today she agrees to labs and stool studies, if this is normal we will proceed with a course of Xifaxan for postinfectious IBS-D.  In the meantime okay to use dicyclomine 10 mg twice daily for postprandial urgency and diarrhea.  MB 12/14/2023 Mary presents for follow-up of reflux and IBS.  Her bowels are doing great on align daily.  She continues to struggle with reflux.  She is on pantoprazole 20 mg twice daily and having to take Pepcid often for breakthrough symptoms.  Her EGD in 2019 shows questionable eosinophilic esophagitis but her biopsies are negative.  Today she agrees to increase her PPI to pantoprazole 40 mg in the morning and famotidine 40 mg before supper, schedule repeat EGD to rule out eosinophilic esophagitis.  MB 4/23/2024 Mary presents for follow-up of reflux and IBS.  Her EGD shows no EOE.  She is on famotidine 40 in the morning and an extra 20 mg dose as needed in the evenings.  She does take Protonix occasionally as needed.  Her reflux is actually improved.  She has had a flare of IBS with bloating cramping and diarrhea.  She would like to repeat a course of Xifaxan.  She is taking align daily.  Today she is doing fairly well otherwise.  MB 12/4/2024 Mary presents for follow-up.  Since her last visit she started Lamictal.  This has improved her mood but she has been having worsening abdominal cramping and bowel irregularity.  She has been taking more dicyclomine and hyoscyamine with increased reflux as well.  She is on pantoprazole 20 mg twice daily and famotidine in the morning.  Today we will start famotidine 40 mg in the morning and pantoprazole 40 mg before supper, we will try hyoscyamine 0.375 twice daily to improve her diarrhea.  Consider repeat colonoscopy plus or minus restarting amitriptyline versus a repeat course of Xifaxan if no relief.  MB

## 2025-01-14 ENCOUNTER — WEB ENCOUNTER (OUTPATIENT)
Dept: URBAN - NONMETROPOLITAN AREA CLINIC 2 | Facility: CLINIC | Age: 40
End: 2025-01-14

## 2025-01-14 RX ORDER — VONOPRAZAN FUMARATE 26.72 MG/1
1 TABLET TABLET ORAL ONCE A DAY
Qty: 90 TABLET | Refills: 3 | OUTPATIENT
Start: 2025-01-14 | End: 2026-01-09

## 2025-02-27 ENCOUNTER — WEB ENCOUNTER (OUTPATIENT)
Dept: URBAN - NONMETROPOLITAN AREA CLINIC 2 | Facility: CLINIC | Age: 40
End: 2025-02-27

## 2025-03-06 NOTE — PHYSICAL EXAM EYES:
Conjuntivae and eyelids appear normal , Sclerae : White without injection Refill Decision Note   Larry Escalante  is requesting a refill authorization.  Brief Assessment and Rationale for Refill:  Approve     Medication Therapy Plan:       Medication Reconciliation Completed: No   Comments:     No Care Gaps recommended.     Note composed:8:53 AM 03/06/2025

## 2025-04-09 ENCOUNTER — OFFICE VISIT (OUTPATIENT)
Dept: URBAN - NONMETROPOLITAN AREA CLINIC 2 | Facility: CLINIC | Age: 40
End: 2025-04-09
Payer: COMMERCIAL

## 2025-04-09 DIAGNOSIS — R19.7 DIARRHEA OF PRESUMED INFECTIOUS ORIGIN: ICD-10-CM

## 2025-04-09 DIAGNOSIS — Z12.11 COLON CANCER SCREENING: ICD-10-CM

## 2025-04-09 DIAGNOSIS — K21.9 ESOPHAGEAL REFLUX: ICD-10-CM

## 2025-04-09 DIAGNOSIS — K58.9 IBS (IRRITABLE BOWEL SYNDROME): ICD-10-CM

## 2025-04-09 PROCEDURE — 99214 OFFICE O/P EST MOD 30 MIN: CPT | Performed by: NURSE PRACTITIONER

## 2025-04-09 RX ORDER — SUCRALFATE 1 G/1
1 TABLET CRUSHED AND MIXED WITH 30ML OF WATER TABLET ORAL
Qty: 180 TABLET | Refills: 3 | OUTPATIENT
Start: 2025-04-09

## 2025-04-09 RX ORDER — PROMETHAZINE HYDROCHLORIDE 25 MG/1
TAKE 1 TABLET BY MOUTH EVERY 6 HOURS AS NEEDED TABLET ORAL
Qty: 60 EACH | Refills: 3 | Status: ACTIVE | COMMUNITY

## 2025-04-09 RX ORDER — FAMOTIDINE 40 MG/1
1 TABLET AT BEDTIME TABLET, FILM COATED ORAL ONCE A DAY
Qty: 90 TABLET | Refills: 3 | Status: ACTIVE | COMMUNITY
Start: 2024-04-23

## 2025-04-09 RX ORDER — ESZOPICLONE 1 MG/1
1 TABLET IMMEDIATELY BEFORE BEDTIME TABLET, COATED ORAL ONCE A DAY
Status: DISCONTINUED | COMMUNITY

## 2025-04-09 RX ORDER — INDOMETHACIN 25 MG/1
TAKE 1 CAPSULE BY MOUTH THREE TIMES DAILY WITH FOOD FOR 2 WEEKS, THEN TAKE AS NEEDED CAPSULE ORAL
Qty: 90 EACH | Refills: 1 | Status: DISCONTINUED | COMMUNITY

## 2025-04-09 RX ORDER — GUAIFENESIN 1200 MG/1
TAKE 1 TABLET BY MOUTH TWICE DAILY TABLET, EXTENDED RELEASE ORAL
Qty: 0 | Refills: 0 | Status: ON HOLD | COMMUNITY
Start: 1900-01-01

## 2025-04-09 RX ORDER — DICYCLOMINE HYDROCHLORIDE 10 MG/1
TAKE 1 CAPSULE BY MOUTH TWICE DAILY BEFORE MEAL(S) CAPSULE ORAL
Qty: 60 CAPSULE | Refills: 11 | Status: ACTIVE | COMMUNITY

## 2025-04-09 RX ORDER — DESVENLAFAXINE SUCCINATE 50 MG/1
1 TABLET TABLET, EXTENDED RELEASE ORAL ONCE A DAY
Status: DISCONTINUED | COMMUNITY

## 2025-04-09 RX ORDER — SUCRALFATE 1 G/1
1 TABLET CRUSHED AND MIXED WITH 30ML OF WATER TABLET ORAL
Qty: 180 TABLET | Refills: 3 | Status: ACTIVE | COMMUNITY
Start: 2024-12-04 | End: 2025-11-29

## 2025-04-09 RX ORDER — HYOSCYAMINE SULFATE 0.38 MG/1
1 TABLET TABLET, EXTENDED RELEASE ORAL
Qty: 60 TABLET | Refills: 11 | Status: ACTIVE | COMMUNITY
Start: 2024-12-04 | End: 2025-11-29

## 2025-04-09 RX ORDER — VONOPRAZAN FUMARATE 26.72 MG/1
1 TABLET TABLET ORAL ONCE A DAY
Qty: 90 TABLET | Refills: 3 | Status: ACTIVE | COMMUNITY
Start: 2025-01-14 | End: 2026-01-09

## 2025-04-09 RX ORDER — TRAZODONE HYDROCHLORIDE 150 MG/1
1 TABLET AT BEDTIME TABLET ORAL ONCE A DAY
Status: DISCONTINUED | COMMUNITY

## 2025-04-09 RX ORDER — TRIMETHOBENZAMIDE HYDROCHLORIDE 300 MG/1
CAPSULE ORAL
Qty: 0 | Refills: 0 | Status: ON HOLD | COMMUNITY
Start: 1900-01-01

## 2025-04-09 RX ORDER — FLUTICASONE PROPIONATE 50 UG/1
SPRAY, METERED NASAL
Qty: 0 | Refills: 0 | Status: ON HOLD | COMMUNITY
Start: 1900-01-01

## 2025-04-09 RX ORDER — TOPIRAMATE 25 MG/1
TAKE 1 TABLET BY MOUTH EVERY MORNING AND 2 TABLETS EVERY EVENING TABLET, COATED ORAL 2
Qty: 0 | Refills: 0 | Status: ON HOLD | COMMUNITY
Start: 1900-01-01

## 2025-04-09 RX ORDER — COLESTIPOL HYDROCHLORIDE 1 G/1
2 TABLETS TABLET, FILM COATED ORAL ONCE A DAY
Qty: 180 TABLET | Refills: 3 | OUTPATIENT
Start: 2025-04-09

## 2025-04-09 RX ORDER — HYOSCYAMINE SULFATE 0.38 MG/1
1 TABLET TABLET, EXTENDED RELEASE ORAL
Qty: 60 TABLET | Refills: 11 | OUTPATIENT
Start: 2025-04-09

## 2025-04-09 RX ORDER — FLUTICASONE PROPIONATE 100 UG/1
INHALE 1 PUFF (100 MCG) BY INHALATION ROUTE 2 TIMES PER DAY POWDER, METERED RESPIRATORY (INHALATION) 2
Qty: 1 | Refills: 0 | Status: ON HOLD | COMMUNITY
Start: 1900-01-01

## 2025-04-09 RX ORDER — MONTELUKAST SODIUM 10 MG/1
TAKE 1 TABLET (10 MG) BY ORAL ROUTE ONCE DAILY IN THE EVENING TABLET, FILM COATED ORAL 1
Qty: 0 | Refills: 0 | Status: ON HOLD | COMMUNITY
Start: 1900-01-01

## 2025-04-09 NOTE — HPI-TODAY'S VISIT:
9/26/2019 Love presents for follow up of reflux esophagitis. She is s/p EGD with 4 cm of reflux esophagitis. Dr. Lucero was concerned about EoE but her biopsies were negative. She is on nexium 2, 20mg cap in the am and 20mg QHS with breakthrough. She has lost 145lb, is off all her psych medications and getting her life back. She is still struggling with the reflux. MB    12/19/2019 Love presents for follow up of reflux and IBS. She is doing wellon BID PPI and famotidine 40mg QHS. She only has breakthrough if she drinks soda. Her IBS has improved with bloating and diarrhea after the xifaxan but she is flaring after being on repeat antibiotics for URI. She is not taking probiotics. Today she is doing well otherwise. MB  6/22/2020 Patient presents for followup of IBS and GERD. She is s/p Xifaxan with no change. She is managing sx with diet. Her reflux is stable on BID PPI and Pepcid. She would like to wean. MB  9/28/2020 Mary presents for follow-up of reflux and irritable bowel syndrome.  She has weaned down to pantoprazole 40 mg in the morning, and famotidine in the evening.  She does have to take an extra dose of famotidine at her suppertime occasionally.  She wakes up at noon most days and eats at 5 PM, she usually does not go to sleep until 3:57 AM.  She agrees her schedule has a lot to do with her reflux and IBS.  She would like to wean down to pantoprazole 20 mg daily.  She is using Levsin as needed for her diarrhea.  MB   4/21/2021 Love presents for follow up of reflux and IBS. She has weaned her protonix to 20mg daily. Her reflux is doing well otherwise. Her IBS is stable on levsin as needed. Today she is doing well. MB  3/29/2023 Love presents for follow up of reflux and IBS. She is flaring and back on pantoprazole 20mg BID. She is doing well most days with occasional breakthrough.She is off all daily medications other than her PPI. She denies significant NSAID use. We have discussed adding pepcid PRN. Consider EGD if on relief or symptoms worsen. MB  This telehealth visit was provided at the patient's home. 6/1/2023 Mary presents for evaluation of acute exasperation of diarrhea.  3 weeks ago she developed urgent watery diarrhea with nausea.  She is having multiple loose urgent bowel movements throughout the day.  She was on antibiotics earlier this year for a sinus infection in January but none since.  She is taking her Levsin intermittently with no relief.  She states she has watery diarrhea after every meal.  She did take Pepto-Bismol which made her stools dark.  She denies any sick contacts.  Her symptoms are slowing down somewhat but she is still having multiple loose bowel movements daily.  Today she agrees to labs and stool studies, if this is normal we will proceed with a course of Xifaxan for postinfectious IBS-D.  In the meantime okay to use dicyclomine 10 mg twice daily for postprandial urgency and diarrhea.  MB  12/14/2023 Mary presents for follow-up of reflux and IBS.  Her bowels are doing great on align daily.  She continues to struggle with reflux.  She is on pantoprazole 20 mg twice daily and having to take Pepcid often for breakthrough symptoms.  Her EGD in 2019 shows questionable eosinophilic esophagitis but her biopsies are negative.  Today she agrees to increase her PPI to pantoprazole 40 mg in the morning and famotidine 40 mg before supper, schedule repeat EGD to rule out eosinophilic esophagitis.  MB  4/23/2024 Mary presents for follow-up of reflux and IBS.  Her EGD shows no EOE.  She is on famotidine 40 in the morning and an extra 20 mg dose as needed in the evenings.  She does take Protonix occasionally as needed.  Her reflux is actually improved.  She has had a flare of IBS with bloating cramping and diarrhea.  She would like to repeat a course of Xifaxan.  She is taking align daily.  Today she is doing fairly well otherwise.  MB  12/4/2024 Mary presents for follow-up.  Since her last visit she started Lamictal.  This has improved her mood but she has been having worsening abdominal cramping and bowel irregularity.  She has been taking more dicyclomine and hyoscyamine with increased reflux as well.  She is on pantoprazole 20 mg twice daily and famotidine in the morning.  Today we will start famotidine 40 mg in the morning and pantoprazole 40 mg before supper, we will try hyoscyamine 0.375 twice daily to improve her diarrhea.  Consider repeat colonoscopy plus or minus restarting amitriptyline versus a repeat course of Xifaxan if no relief.  MB  4/9/2025 Mary presents for follow up for loose stools. She did not get labs or stool studies completed after her last visit. She is still experiencing diarrhea. She reports 2-3 loose BMS a day and up to 6-7 BMs on a bad day. She reports that her abd cramping has not improved- she takes Hyoscyamine daily and Bentyl as needed. She reports that her reflux is well controlled on Voquezna 20mg. Today we will try and decrease her Voquezna dose to 10mg and see if she tolerates the change. If not, will increase dosage back to 20mg. We will also try Colestipol due to hx of CCY. She agrees to do stool studies if Colestipol does not improve loose stools. HJ

## 2025-04-24 ENCOUNTER — WEB ENCOUNTER (OUTPATIENT)
Dept: URBAN - NONMETROPOLITAN AREA CLINIC 2 | Facility: CLINIC | Age: 40
End: 2025-04-24

## 2025-04-24 RX ORDER — VONOPRAZAN FUMARATE 26.72 MG/1
1 TABLET TABLET ORAL ONCE A DAY
Qty: 90 TABLET | Refills: 3
Start: 2025-01-14 | End: 2026-04-22

## 2025-07-28 ENCOUNTER — OFFICE VISIT (OUTPATIENT)
Dept: URBAN - NONMETROPOLITAN AREA CLINIC 2 | Facility: CLINIC | Age: 40
End: 2025-07-28
Payer: COMMERCIAL

## 2025-07-28 ENCOUNTER — LAB OUTSIDE AN ENCOUNTER (OUTPATIENT)
Dept: URBAN - NONMETROPOLITAN AREA CLINIC 2 | Facility: CLINIC | Age: 40
End: 2025-07-28

## 2025-07-28 DIAGNOSIS — R19.7 DIARRHEA OF PRESUMED INFECTIOUS ORIGIN: ICD-10-CM

## 2025-07-28 DIAGNOSIS — K21.9 ESOPHAGEAL REFLUX: ICD-10-CM

## 2025-07-28 DIAGNOSIS — K58.9 IBS (IRRITABLE BOWEL SYNDROME): ICD-10-CM

## 2025-07-28 DIAGNOSIS — Z12.11 COLON CANCER SCREENING: ICD-10-CM

## 2025-07-28 PROCEDURE — 99214 OFFICE O/P EST MOD 30 MIN: CPT | Performed by: NURSE PRACTITIONER

## 2025-07-28 RX ORDER — DICYCLOMINE HYDROCHLORIDE 10 MG/1
TAKE 1 CAPSULE BY MOUTH TWICE DAILY BEFORE MEAL(S) CAPSULE ORAL
Qty: 60 CAPSULE | Refills: 11 | Status: ACTIVE | COMMUNITY

## 2025-07-28 RX ORDER — VONOPRAZAN FUMARATE 26.72 MG/1
1 TABLET TABLET ORAL ONCE A DAY
Qty: 90 TABLET | Refills: 3 | Status: ACTIVE | COMMUNITY
Start: 2025-01-14 | End: 2026-04-22

## 2025-07-28 RX ORDER — TRIMETHOBENZAMIDE HYDROCHLORIDE 300 MG/1
CAPSULE ORAL
Qty: 0 | Refills: 0 | Status: ON HOLD | COMMUNITY
Start: 1900-01-01

## 2025-07-28 RX ORDER — PROMETHAZINE HYDROCHLORIDE 25 MG/1
TAKE 1 TABLET BY MOUTH EVERY 6 HOURS AS NEEDED TABLET ORAL
Qty: 60 EACH | Refills: 3 | Status: ACTIVE | COMMUNITY

## 2025-07-28 RX ORDER — FLUTICASONE PROPIONATE 50 UG/1
SPRAY, METERED NASAL
Qty: 0 | Refills: 0 | Status: ON HOLD | COMMUNITY
Start: 1900-01-01

## 2025-07-28 RX ORDER — GUAIFENESIN 1200 MG/1
TAKE 1 TABLET BY MOUTH TWICE DAILY TABLET, EXTENDED RELEASE ORAL
Qty: 0 | Refills: 0 | Status: ON HOLD | COMMUNITY
Start: 1900-01-01

## 2025-07-28 RX ORDER — HYOSCYAMINE SULFATE 0.38 MG/1
1 TABLET TABLET, EXTENDED RELEASE ORAL
Qty: 60 TABLET | Refills: 11 | Status: ACTIVE | COMMUNITY
Start: 2025-04-09

## 2025-07-28 RX ORDER — FAMOTIDINE 40 MG/1
1 TABLET AT BEDTIME TABLET, FILM COATED ORAL ONCE A DAY
Qty: 90 TABLET | Refills: 3 | Status: ACTIVE | COMMUNITY
Start: 2024-04-23

## 2025-07-28 RX ORDER — SUCRALFATE 1 G/1
1 TABLET CRUSHED AND MIXED WITH 30ML OF WATER TABLET ORAL
Qty: 180 TABLET | Refills: 3 | Status: ACTIVE | COMMUNITY
Start: 2025-04-09

## 2025-07-28 RX ORDER — FLUTICASONE PROPIONATE 100 UG/1
INHALE 1 PUFF (100 MCG) BY INHALATION ROUTE 2 TIMES PER DAY POWDER, METERED RESPIRATORY (INHALATION) 2
Qty: 1 | Refills: 0 | Status: ON HOLD | COMMUNITY
Start: 1900-01-01

## 2025-07-28 RX ORDER — COLESTIPOL HYDROCHLORIDE 1 G/1
2 TABLETS TABLET, FILM COATED ORAL ONCE A DAY
Qty: 180 TABLET | Refills: 3 | Status: ACTIVE | COMMUNITY
Start: 2025-04-09

## 2025-07-28 RX ORDER — MONTELUKAST SODIUM 10 MG/1
TAKE 1 TABLET (10 MG) BY ORAL ROUTE ONCE DAILY IN THE EVENING TABLET, FILM COATED ORAL 1
Qty: 0 | Refills: 0 | Status: ON HOLD | COMMUNITY
Start: 1900-01-01

## 2025-07-28 RX ORDER — TOPIRAMATE 25 MG/1
TAKE 1 TABLET BY MOUTH EVERY MORNING AND 2 TABLETS EVERY EVENING TABLET, COATED ORAL 2
Qty: 0 | Refills: 0 | Status: ON HOLD | COMMUNITY
Start: 1900-01-01

## 2025-07-28 NOTE — HPI-TODAY'S VISIT:
7/28/2025 Love [place], a 39-year-old female, came in for a chronic condition follow-up focused on her acid reflux and gastrointestinal symptoms. She reported that her reflux and stomach issues have been well controlled recently, with rare cramps and minimal heartburn, but her stomach was significantly upset after a recent course of Augmentin, which she only completed [facility] due to side effects. She expressed frustration about the impact of antibiotics on her well-being and shared that she would have managed her ear pain differently if she had known the consequences. She takes Colestipol daily, sometimes twice if she has diarrhea, and avoids probiotics due to cost. Love also described intermittent, severe pain under her gallbladder scar, which is not daily but can be intense. She wondered if this could be due to adhesions from her prior cholecystectomy. She has not typically used dicyclomine or Levsin for this pain. Additionally, Love acknowledged significant weight gain over the past year and feels her depression and anxiety are so debilitating that making lifestyle changes feels overwhelming. She expressed a desire for better physical health but feels unable to achieve it due to her mental health challenges, and noted that previous weight loss occurred when she stopped psychiatric medications or was not eating due to depression. MB

## 2025-08-05 ENCOUNTER — TELEPHONE ENCOUNTER (OUTPATIENT)
Dept: URBAN - NONMETROPOLITAN AREA CLINIC 2 | Facility: CLINIC | Age: 40
End: 2025-08-05

## 2025-08-07 ENCOUNTER — TELEPHONE ENCOUNTER (OUTPATIENT)
Dept: URBAN - NONMETROPOLITAN AREA CLINIC 2 | Facility: CLINIC | Age: 40
End: 2025-08-07